# Patient Record
Sex: FEMALE | Race: WHITE
[De-identification: names, ages, dates, MRNs, and addresses within clinical notes are randomized per-mention and may not be internally consistent; named-entity substitution may affect disease eponyms.]

---

## 2017-01-25 NOTE — [facility]
PROCEDURE 

Two views right knee. 

 

HISTORY 

Knee pain anteriorly for 3 months. Jumping injury. 

 

TECHNIQUE 

Two views of the right knee are submitted for review. 

 

COMPARISON 

None. 

 

FINDINGS 

There is no fracture or dislocation. There is a small joint effusion. 

There is minimal spurring in the lateral compartment. There is also 

probably chondrocalcinosis in the lateral compartment. There is very 

minimal patellofemoral spurring. 

 

IMPRESSION 

1. Negative for fracture or dislocation.

2. Small joint effusion.

3. Mild osteoarthritis. Chondrocalcinosis.

 

 

 

Electronically signed by: Janes Paige MD (Jan 25, 2017 14:57:00)

## 2017-01-31 NOTE — [facility]
PROCEDURE 

MR of the right knee 

 

HISTORY 

Right knee pain. Lump behind the knee. Pain since October 2016. 

 

COMPARISON 

None 

 

TECHNIQUE 

Standard noncontrast images are obtained. 

 

FINDINGS 

No evidence of a medial meniscal tear. 

Degenerative tear of the lateral meniscus. 

Anterior and posterior cruciate ligaments are intact. Medial collateral 

ligament demonstrates some scarring but no acute tear 

Iliotibial band unremarkable. Fibular collateral ligament, biceps femoris 

tendon and popliteus tendon are intact. Extensor mechanism is intact. 

Large joint effusion. No evidence of osteochondral loose body. 

Articular cartilage defect at posterior lateral tibial plateau measures 

about 5 millimeters AP diameter millimeters wide with rounded chronic 

margins. Moderate to severe chondromalacia at posterior weight-bearing 

lateral femoral condyle. Moderate chondromalacia of the patella. 

There is no bone lesion. No acute fracture. Small Baker cyst. 

Edema or inflammation within the infrapatellar fat. Generalized soft 

tissue edema particularly posterior to the distal femur. 

 

 

IMPRESSION 

1. Lateral meniscal tear.

2. Primary osteoarthritis, greatest at the posterior aspect of the lateral

joint compartment.

 

 

 

Electronically signed by: Raimundo Reyes MD (Jan 31, 2017 15:32:21)

## 2017-11-29 NOTE — [facility]
EXAM: Dual energy x-ray absorptiometry (DEXA).

 

HISTORY: Postmenopausal female presents for osteoporosis screening.

 

COMPARISON: 12/9/2014.

 

TECHNIQUE: Dual energy x-ray absorptiometry of the lumbar spine and left 

hip was performed.  Calculation of bone mineral density based on standard 

deviations above or below the expected young adult normal value (T-score) 

was completed. 

 

FINDINGS: The average bone mineral density in the 1st through 4th lumbar 

vertebrae is 0.936 g/cmxcm, corresponding with a T-score of -1.0. There 

has been a 1.4 percent decrease in density of the lumbar spine compared to

the prior study.

 

The average total bone mineral density in the left hip is 0.791 g/cmxcm, 

corresponding with a  T-score of -1.2. There has been a 4.1 percent 

decrease in density of the left hip compared to the prior study.

 

IMPRESSION: Osteopenia.

 

Note: Definitions established by the World Health Organization:

 

1. Normal: T-score is -1.0 or above.

2. Osteopenia: T-score is between -1.0 and -2.5 .

3. Osteoporosis: T-score is -2.5 or below. 

 

Electronically signed by: Jami Levine MD (11/29/2017 9:39 AM) 

[facility]

## 2019-12-02 ENCOUNTER — HOSPITAL ENCOUNTER (OUTPATIENT)
Dept: HOSPITAL 61 - KCIC DEXA | Age: 74
Discharge: HOME | End: 2019-12-02
Attending: FAMILY MEDICINE
Payer: MEDICARE

## 2019-12-02 DIAGNOSIS — M85.88: Primary | ICD-10-CM

## 2019-12-02 PROCEDURE — 77080 DXA BONE DENSITY AXIAL: CPT

## 2019-12-02 NOTE — [facility]
Indication: Postmenopausal screening for osteoporosis. Follow-up study.

 

COMPARISON: November 29, 2017.

 

Bone Density:

-BMD:

(g/cm2)

- AP Spine Total (L1-L4).......... 0.938.

- Total left Hip................. 0.774. 

T-Score:

- AP Spine Total (L1-L4)......... -1.0.

- Total left Hip................. -1.4. 

Z-Score:

- AP Spine Total (L1-L4).......... 1.3.

- Total left Hip................. 0.3. 

World Health Organization criteria for BMD interpretation classify 

patients as Normal (T-score at or above -1.0), Osteopenic (T-score between

-1.0 and -2.5), or Osteoporotic (T-score at or below -2.5).

 

 

Impression:

1. AP Spine Total L1-L4--- borderline normal. Since the previous study, 

there has been no significant change.

2. Total left Hip--- osteopenia. Since the previous study, there has been 

a decrease in the BMD of approximately 2 percent..

 

Electronically signed by: Domingo Macdonald MD (12/2/2019 9:00 AM) [facility]

## 2021-10-18 ENCOUNTER — HOSPITAL ENCOUNTER (OUTPATIENT)
Dept: HOSPITAL 61 - SURG | Age: 76
Discharge: HOME | End: 2021-10-18
Attending: PLASTIC SURGERY
Payer: MEDICARE

## 2021-10-18 VITALS
DIASTOLIC BLOOD PRESSURE: 59 MMHG | SYSTOLIC BLOOD PRESSURE: 114 MMHG | SYSTOLIC BLOOD PRESSURE: 114 MMHG | DIASTOLIC BLOOD PRESSURE: 59 MMHG

## 2021-10-18 VITALS — BODY MASS INDEX: 20.45 KG/M2 | HEIGHT: 64.5 IN | WEIGHT: 121.25 LBS

## 2021-10-18 VITALS — SYSTOLIC BLOOD PRESSURE: 130 MMHG | DIASTOLIC BLOOD PRESSURE: 61 MMHG

## 2021-10-18 DIAGNOSIS — Z79.899: ICD-10-CM

## 2021-10-18 DIAGNOSIS — E78.00: ICD-10-CM

## 2021-10-18 DIAGNOSIS — Z85.3: ICD-10-CM

## 2021-10-18 DIAGNOSIS — M19.90: ICD-10-CM

## 2021-10-18 DIAGNOSIS — M67.431: Primary | ICD-10-CM

## 2021-10-18 DIAGNOSIS — Z98.890: ICD-10-CM

## 2021-10-18 PROCEDURE — A4930 STERILE, GLOVES PER PAIR: HCPCS

## 2021-10-18 PROCEDURE — 25111 REMOVE WRIST TENDON LESION: CPT

## 2021-10-18 PROCEDURE — A6402 STERILE GAUZE <= 16 SQ IN: HCPCS

## 2021-10-18 PROCEDURE — A4657 SYRINGE W/WO NEEDLE: HCPCS

## 2021-10-18 PROCEDURE — A6452 HIGH COMPRES BAND W>=3"<5"YD: HCPCS

## 2021-10-18 NOTE — PDOC4
OPERATIVE NOTE


Date:


Date:  Oct 18, 2021





Pre-Op Diagnosis:


Ganglion cyst right volar wrist





Post-Op Diagnosis:


Same





Procedure Performed:


Excision ganglion cyst right volar wrist


CPT 00121


Incision [id number]





Surgeon:


Laisha Hawk MD





Anesthesia Type:


General LMA anesthesia





Blood Loss:


5 cc





Specimans Obtained:


None





Findings:


See operative note





Complications:


None





Operative Note:


This is a pleasant 76-year-old female who presented with a longstanding right 

wrist volar radial ganglion cyst.As it occasionally causes her discomfort she 

presents today for removal.


The patient was marked in the perioperative holding area.  The area of the cyst 

was identified and confirmed with the patient.A horizontal marking was made to 

indicate the incision.


The risks of bleeding, infection, injury to surrounding nerves, vessels, and 

tendons, as well as recurrence were discussed with the patient and her . 

The patient understood the risks and desire to proceed.





The patient was brought to the operating room and placed on the OR table in the 

supine position.  SCDs were placed.  Antibiotics were given.  General LMA 

anesthesia was induced.  The right upper extremity was prepped with ChloraPrep 

and draped in a sterile fashion.  A sterile tourniquet was applied and infla

essie.The area of the cyst was injected with a 50-50 mixture of 0.25% Marcaine and

1% lidocaine with epinephrine. A horizontal incision was made.  Tenotomy 

scissors were used to dissect through the subcutaneous tissues.  Sensory nerves 

were gently retracted.  The radial artery was identified and gently 

retracted.The palmaris longus tendon was identified and retracted.  The flexor 

retinaculum was identified.  The previously outlined region was thoroughly 

explored.  A cystic sac was identified and grasped with forceps.  This was noted

to be adherent to the radial artery.  The sac was incised and gently teased from

surrounding tissue.  The sac was not excised due to its close nature to the 

radial artery.  The stalk was released from its extension toward the joint. The 

wound was irrigated with saline.  The skin was closed with 4-0 nylon suture in 

an interrupted horizontal mattress fashion. Bacitracin was applied followed by 

Xeroform gauze, a 4 x 4 gauze, rolled Kerlix, and an Ace bandage.  The patient 

tolerated the procedure well.











LAISHA HAWK MD           Oct 18, 2021 15:48

## 2021-12-06 ENCOUNTER — HOSPITAL ENCOUNTER (OUTPATIENT)
Dept: HOSPITAL 61 - KCIC DEXA | Age: 76
End: 2021-12-06
Attending: FAMILY MEDICINE
Payer: MEDICARE

## 2021-12-06 DIAGNOSIS — M85.89: Primary | ICD-10-CM

## 2021-12-06 DIAGNOSIS — Z78.0: ICD-10-CM

## 2021-12-06 PROCEDURE — 77080 DXA BONE DENSITY AXIAL: CPT

## 2021-12-07 NOTE — [facility]
INDICATION: Screening for osteopenia/osteoporosis.  Reason: OSTEOPENIA / Spl. Instructions:  / Histor
y: . Postmenopausal follow-up.



COMPARISON: 12/2/2019



TECHNIQUE:  Bone densitometry was performed through the lumbar spine and proximal femur.



IMPRESSION: 



Lumbar Spine: 

BMD: 0.93

T-Score: -1.0

Range: Osteopenic. Similar prior. 



Proximal Femur:

BMD: 0.71

T-Score: -1.9

Range: Osteopenic. Decreased by 9 percent from prior. 







World Health Organization Criteria for Bone Density:



T-Score:

> -1.0: Normal Range

< -1.0 to -2.5:  Osteopenic Range

< -2.5: Osteoporotic Range



Electronically signed by: Dannie Quintero MD (12/7/2021 8:04 AM) [id number]